# Patient Record
Sex: FEMALE | Race: WHITE | NOT HISPANIC OR LATINO
[De-identification: names, ages, dates, MRNs, and addresses within clinical notes are randomized per-mention and may not be internally consistent; named-entity substitution may affect disease eponyms.]

---

## 2017-03-29 ENCOUNTER — RESULT REVIEW (OUTPATIENT)
Age: 42
End: 2017-03-29

## 2018-03-05 ENCOUNTER — RESULT REVIEW (OUTPATIENT)
Age: 43
End: 2018-03-05

## 2019-03-25 ENCOUNTER — RESULT REVIEW (OUTPATIENT)
Age: 44
End: 2019-03-25

## 2020-06-03 ENCOUNTER — RESULT REVIEW (OUTPATIENT)
Age: 45
End: 2020-06-03

## 2021-02-04 ENCOUNTER — RESULT REVIEW (OUTPATIENT)
Age: 46
End: 2021-02-04

## 2022-04-04 ENCOUNTER — RESULT REVIEW (OUTPATIENT)
Age: 47
End: 2022-04-04

## 2022-04-26 ENCOUNTER — TRANSCRIPTION ENCOUNTER (OUTPATIENT)
Age: 47
End: 2022-04-26

## 2022-04-27 PROBLEM — Z00.00 ENCOUNTER FOR PREVENTIVE HEALTH EXAMINATION: Status: ACTIVE | Noted: 2022-04-27

## 2022-05-05 ENCOUNTER — APPOINTMENT (OUTPATIENT)
Dept: ORTHOPEDIC SURGERY | Facility: CLINIC | Age: 47
End: 2022-05-05
Payer: COMMERCIAL

## 2022-05-05 VITALS — WEIGHT: 127 LBS | HEIGHT: 65 IN | BODY MASS INDEX: 21.16 KG/M2

## 2022-05-05 DIAGNOSIS — M79.645 PAIN IN LEFT FINGER(S): ICD-10-CM

## 2022-05-05 PROCEDURE — 99203 OFFICE O/P NEW LOW 30 MIN: CPT

## 2022-05-05 PROCEDURE — 73140 X-RAY EXAM OF FINGER(S): CPT | Mod: LT

## 2022-05-05 NOTE — IMAGING
[de-identified] : LEFT hand\par MF subungual hematoma, exam limited by nail polish.\par min TTP to MF distal phalanx.\par good EPL, FPL. good finger extension, flexion to full fist.\par SILT to median, ulnar, radial distribution. \par brisk cap refill all digits.\par no triggering.\par  [Left] : left fingers [FreeTextEntry9] : MIDDLE: possible non-displaced distal phalanx tuft fx. sclerosis of distal phalanx of MF and visualized portions of adjacent digits.

## 2022-05-05 NOTE — ASSESSMENT
[FreeTextEntry1] : Discussed possible non-displaced distal phalanx tuft fx. However, she is minimally symptomatic. \par Activity as tolerated.\par \par F/u PRN. I have personally seen and examined this patient. I have fully participated in the care of this patient. I have reviewed all pertinent clinical information, including history physical exam, plan and the Resident's note and agree except as noted

## 2022-05-05 NOTE — HISTORY OF PRESENT ILLNESS
[Sudden] : sudden [0] : 0 [de-identified] : 5/5/22: 46yo RHD F () with LEFT middle finger DIP hyperextension injury on 4/24/22 after a dumbbell bounced up and injured the finger. There was bruising, swelling and pain at the tip of the left middle finger.  She was then seen at urgent care where x-rays were done and inconclusive. She was given a splint, which she is not wearing today due to minimal pain. No prior finger injuries. \par \par Hx: hypothyroid s/p Hashimoto's thyroiditis. [] : no [FreeTextEntry1] : left middle finger  [FreeTextEntry6] : numb [FreeTextEntry9] : none [de-identified] : none [de-identified] : 4/24/22 [de-identified] : [facility]

## 2022-05-08 ENCOUNTER — NON-APPOINTMENT (OUTPATIENT)
Age: 47
End: 2022-05-08

## 2022-08-16 ENCOUNTER — NON-APPOINTMENT (OUTPATIENT)
Age: 47
End: 2022-08-16

## 2022-09-19 ENCOUNTER — NON-APPOINTMENT (OUTPATIENT)
Age: 47
End: 2022-09-19

## 2024-08-04 ENCOUNTER — NON-APPOINTMENT (OUTPATIENT)
Age: 49
End: 2024-08-04

## 2025-06-21 ENCOUNTER — NON-APPOINTMENT (OUTPATIENT)
Age: 50
End: 2025-06-21

## 2025-07-29 ENCOUNTER — NON-APPOINTMENT (OUTPATIENT)
Age: 50
End: 2025-07-29